# Patient Record
Sex: MALE | Race: WHITE | NOT HISPANIC OR LATINO | Employment: OTHER | URBAN - METROPOLITAN AREA
[De-identification: names, ages, dates, MRNs, and addresses within clinical notes are randomized per-mention and may not be internally consistent; named-entity substitution may affect disease eponyms.]

---

## 2022-03-28 ENCOUNTER — TELEPHONE (OUTPATIENT)
Dept: FAMILY MEDICINE | Facility: CLINIC | Age: 62
End: 2022-03-28
Payer: MEDICARE

## 2022-08-12 ENCOUNTER — TELEPHONE (OUTPATIENT)
Dept: FAMILY MEDICINE | Facility: CLINIC | Age: 62
End: 2022-08-12
Payer: MEDICARE

## 2022-08-12 NOTE — TELEPHONE ENCOUNTER
----- Message from Merlyn Ramsey sent at 8/12/2022  2:59 PM CDT -----  Contact: self  Requesting a call back regarding scheduling new ID patient appointment. Please call back at [phone]

## 2023-01-05 ENCOUNTER — TELEPHONE (OUTPATIENT)
Dept: FAMILY MEDICINE | Facility: CLINIC | Age: 63
End: 2023-01-05
Payer: MEDICARE

## 2023-02-02 ENCOUNTER — OFFICE VISIT (OUTPATIENT)
Dept: INFECTIOUS DISEASES | Facility: CLINIC | Age: 63
End: 2023-02-02
Payer: MEDICARE

## 2023-02-02 VITALS
HEART RATE: 78 BPM | SYSTOLIC BLOOD PRESSURE: 133 MMHG | DIASTOLIC BLOOD PRESSURE: 75 MMHG | OXYGEN SATURATION: 98 % | HEIGHT: 68 IN

## 2023-02-02 DIAGNOSIS — B18.2 CHRONIC HEPATITIS C WITHOUT HEPATIC COMA: Primary | ICD-10-CM

## 2023-02-02 DIAGNOSIS — R74.01 TRANSAMINITIS: ICD-10-CM

## 2023-02-02 PROCEDURE — 3075F SYST BP GE 130 - 139MM HG: CPT | Mod: CPTII,S$GLB,, | Performed by: NURSE PRACTITIONER

## 2023-02-02 PROCEDURE — 3075F PR MOST RECENT SYSTOLIC BLOOD PRESS GE 130-139MM HG: ICD-10-PCS | Mod: CPTII,S$GLB,, | Performed by: NURSE PRACTITIONER

## 2023-02-02 PROCEDURE — 3078F PR MOST RECENT DIASTOLIC BLOOD PRESSURE < 80 MM HG: ICD-10-PCS | Mod: CPTII,S$GLB,, | Performed by: NURSE PRACTITIONER

## 2023-02-02 PROCEDURE — 1159F PR MEDICATION LIST DOCUMENTED IN MEDICAL RECORD: ICD-10-PCS | Mod: CPTII,S$GLB,, | Performed by: NURSE PRACTITIONER

## 2023-02-02 PROCEDURE — 99204 OFFICE O/P NEW MOD 45 MIN: CPT | Mod: S$GLB,,, | Performed by: NURSE PRACTITIONER

## 2023-02-02 PROCEDURE — 99204 PR OFFICE/OUTPT VISIT, NEW, LEVL IV, 45-59 MIN: ICD-10-PCS | Mod: S$GLB,,, | Performed by: NURSE PRACTITIONER

## 2023-02-02 PROCEDURE — 1159F MED LIST DOCD IN RCRD: CPT | Mod: CPTII,S$GLB,, | Performed by: NURSE PRACTITIONER

## 2023-02-02 PROCEDURE — 3078F DIAST BP <80 MM HG: CPT | Mod: CPTII,S$GLB,, | Performed by: NURSE PRACTITIONER

## 2023-02-02 RX ORDER — BUSPIRONE HYDROCHLORIDE 10 MG/1
TABLET ORAL
COMMUNITY
Start: 2023-01-16

## 2023-02-02 RX ORDER — METFORMIN HYDROCHLORIDE 500 MG/1
TABLET ORAL
COMMUNITY
Start: 2023-01-15

## 2023-02-02 RX ORDER — GABAPENTIN 100 MG/1
100 CAPSULE ORAL 2 TIMES DAILY PRN
COMMUNITY
Start: 2023-01-12

## 2023-02-02 RX ORDER — ATORVASTATIN CALCIUM 10 MG/1
TABLET, FILM COATED ORAL
COMMUNITY

## 2023-02-02 RX ORDER — BUDESONIDE AND FORMOTEROL FUMARATE DIHYDRATE 160; 4.5 UG/1; UG/1
AEROSOL RESPIRATORY (INHALATION)
COMMUNITY

## 2023-02-02 NOTE — PROGRESS NOTES
Infectious Diseases Clinic Note    Subjective:       Patient ID: Kirk Reddy is a 62 y.o. male     Chief Complaint: Referral (Chronic hepatitis C)        HEPATOLOGY CLINIC VISIT NOTE - HCV clinic     CHIEF COMPLAINT: Hepatitis C      HISTORY:      62 M. patient is here today for hepatitis-C.  He is accompanied by his wife.  She has a history of hepatitis-C that was treated.  No recent repeat RNA for her.  Patient has a history of COPD, multiple hospital stays, and ER visits.  History of transaminitis.  He has been incarcerated.  He has a history of IV drug use.  He tells me that he had hepatitis-C for over 20 years without treatment.  He has 1 homemade tattoo. Denies current drug or alcohol use.      Denies jaundice, dark urine, hematemesis, melena, abdominal distention. + hallucinations in the past. Also has altered mental status at times. Reports no acute issues at this time.         HCV history:    Originally diagnosed: 20 yrs ago   Prior icteric illnesses: None  Risks for HCV:  IVDA, ETOH, Tattoos, unprotected sex, incarcerated, wife was positive   - Treatment naïve  - Genotype unknown  - NS5A resistance not known                       Past Medical History:   Diagnosis Date    Anxiety disorder, unspecified     Chronic hepatitis C     COPD (chronic obstructive pulmonary disease)     Degenerative joint disease     Depressive disorder     HLD (hyperlipidemia)     Type 2 diabetes mellitus without complications        Social History     Socioeconomic History    Marital status: Single   Tobacco Use    Smoking status: Every Day     Types: Cigarettes    Smokeless tobacco: Never         Current Outpatient Medications:     empagliflozin (JARDIANCE) 25 mg tablet, Jardiance 25 mg tablet, Disp: , Rfl:     atorvastatin (LIPITOR) 10 MG tablet, atorvastatin 10 mg tablet, Disp: , Rfl:     budesonide-formoterol 160-4.5 mcg (SYMBICORT) 160-4.5 mcg/actuation HFAA, Symbicort 160 mcg-4.5 mcg/actuation HFA aerosol inhaler, Disp: ,  Rfl:     busPIRone (BUSPAR) 10 MG tablet, , Disp: , Rfl:     gabapentin (NEURONTIN) 100 MG capsule, Take 100 mg by mouth 2 (two) times daily as needed., Disp: , Rfl:     hepatitis A and B vaccine, PF, (TWINRIX) 720 MARIANO unit- 20 mcg/mL Syrg suspension, 1 mL given IM on a 0-, 1-, and 6-month schedule for a total of 3 doses, Disp: 1 mL, Rfl: 2    metFORMIN (GLUCOPHAGE) 500 MG tablet, , Disp: , Rfl:     Review of Systems   Constitutional:  Negative for appetite change, chills and fever.   Eyes:  Negative for visual disturbance.   Respiratory:  Negative for cough, chest tightness, shortness of breath and wheezing.    Cardiovascular:  Negative for chest pain.   Gastrointestinal:  Negative for abdominal distention, abdominal pain, blood in stool, diarrhea, nausea and vomiting.   Endocrine: Negative for polydipsia, polyphagia and polyuria.   Genitourinary:  Negative for difficulty urinating, dysuria, flank pain and hematuria.   Musculoskeletal:  Negative for back pain and neck pain.   Skin:  Negative for rash.   Neurological:  Negative for dizziness, tremors and headaches.   Hematological:  Negative for adenopathy. Does not bruise/bleed easily.   Psychiatric/Behavioral:  Negative for confusion, dysphoric mood, hallucinations, sleep disturbance and suicidal ideas.          Objective:      Vitals:    02/02/23 1445   BP: 133/75   Pulse: 78     Physical Exam  Vitals and nursing note reviewed.   Constitutional:       General: He is awake. He is not in acute distress.     Appearance: He is underweight. He is not ill-appearing, toxic-appearing or diaphoretic.   HENT:      Head: Normocephalic and atraumatic.   Eyes:      General: Scleral icterus present.      Pupils: Pupils are equal, round, and reactive to light.   Cardiovascular:      Rate and Rhythm: Normal rate.   Pulmonary:      Effort: Pulmonary effort is normal.   Abdominal:      General: Abdomen is protuberant. Bowel sounds are normal.      Palpations: Abdomen is soft.    Musculoskeletal:         General: Normal range of motion.   Skin:     General: Skin is warm and dry.      Coloration: Skin is not jaundiced.      Findings: No rash.   Neurological:      Mental Status: He is alert and oriented to person, place, and time. Mental status is at baseline.   Psychiatric:         Attention and Perception: Attention and perception normal.         Mood and Affect: Mood and affect normal.         Speech: Speech normal.         Behavior: Behavior normal. Behavior is cooperative.         Thought Content: Thought content normal.         Cognition and Memory: Cognition and memory normal.         Judgment: Judgment normal.           Assessment/Plan:       1. Chronic hepatitis C without hepatic coma    2. Transaminitis      Problem List Items Addressed This Visit          GI    Chronic hepatitis C - Primary    Current Assessment & Plan        HCV history:    HCV Ab+  HCV RNA> 400K     Originally diagnosed: 20 yrs ago   Prior icteric illnesses: None  Risks for HCV:  IVDA, ETOH, Tattoos, unprotected sex, incarcerated, wife was positive   - Treatment naïve  - Genotype unknown  - NS5A resistance not known    PLAN    1. Baseline labs. Handed a copy of orders.    2. US abd. Orders provided.   3. Hand copy of Hep A/B vaccine   4. F/u in 4 weeks for treatment.          Relevant Medications    hepatitis A and B vaccine, PF, (TWINRIX) 720 MARIANO unit- 20 mcg/mL Syrg suspension    Other Relevant Orders    HIV 1/2 Ag/Ab (4th Gen)    AFP Tumor Marker    Ammonia    Phosphatidylethanol (PETH)    APTT    Protime-INR    Iron, TIBC and Ferritin Panel    Comprehensive Metabolic Panel    CBC Auto Differential    Hepatitis C Genotype    Hepatitis B Surface Ab, Qualitative    Hepatitis B Surface Antigen    HCV Fibrosure    Hepatitis C RNA, Quantitative, PCR    Hepatitis C Antibody    Hepatitis B Core Antibody, IgM    Hepatitis A Antibody, IgM    Hepatitis B e antibody    Hepatitis B e Antigen    Treponema Pallidum  (Syphillis) Antibody    Hepatitis B Core Antibody, Total    US Abdomen Limited     Other Visit Diagnoses       Transaminitis        Relevant Medications    hepatitis A and B vaccine, PF, (TWINRIX) 720 MARIANO unit- 20 mcg/mL Syrg suspension    Other Relevant Orders    HIV 1/2 Ag/Ab (4th Gen)    AFP Tumor Marker    Ammonia    Phosphatidylethanol (PETH)    APTT    Protime-INR    Iron, TIBC and Ferritin Panel    Comprehensive Metabolic Panel    CBC Auto Differential    Hepatitis C Genotype    Hepatitis B Surface Ab, Qualitative    Hepatitis B Surface Antigen    HCV Fibrosure    Hepatitis C RNA, Quantitative, PCR    Hepatitis C Antibody    Hepatitis B Core Antibody, IgM    Hepatitis A Antibody, IgM    Hepatitis B e antibody    Hepatitis B e Antigen    Treponema Pallidum (Syphillis) Antibody    Hepatitis B Core Antibody, Total    US Abdomen Limited           No visits with results within 1 Month(s) from this visit.   Latest known visit with results is:   No results found for any previous visit.      No results found in the last 30 days.      Duration of encounter: 40 minutes  This includes face-to-face time and non face-to-face time preparing to see the patient (eg, review of tests), obtaining and/or reviewing separately obtained history, documenting clinical information in the electronic or other health record, independently interpreting resultsand communicating results to the patient/family/caregiver, or care coordination.      All diagnostic data (labs/imaging) was reviewed with the patient and/or family member in the room.  All questions were answered to their liking. The patient and/or family member voiced understanding of all instructions provided. Expectations regarding follow up and treatment plan were voiced and confirmed prior to departure. The patient was given orders/instructions at the end of the visit for reference. They were instructed to notify my office if they have not been contacted for  imaging/referrals/labs/results in 1-2 weeks. They voiced understanding of all of the above.     Follow up:     Patient Instructions     EDUCATION:  The natural history of Hepatitis C, including potential progression to cirrhosis was reviewed. We discussed the increased progression of liver disease secondary to alcohol use; patient was advised to avoid alcohol completely.      Transmission of Hepatitis C was reviewed, including possible sexual transmission. Sexual contacts should be screened. Patient should avoid sharing personal products such as razors, toothbrushes, etc.      Recommend avoiding raw seafood.  Limit acetaminophen to 2000mg daily.  What can you do about your liver???  Avoid alcohol and/or drugs  Avoid NSAIDs (ibuprofen, naproxen, aleve, etc)  Limit the use of Tylenol containing products  Avoid sleeping pills  Receive Hepatitis A & B vaccinations  Receive Pneumococcal vaccinations in addition to annual flu vaccines  Avoid sodium in your diet. Avoid canned or prepared foods   Avoid constipation   Quit smoking           Patient Education       Hepatitis C Discharge Instructions   About this topic   Hepatitis C is a disease that harms the liver. It is caused by a virus and can either cause an acute or chronic infection. The liver becomes damaged and does not work well. The virus spreads from person to person through contact with infected blood. Two ways this may happen are having unprotected sex or sharing needles. Also, children born to a mother with hepatitis C may get hepatitis C. There are many other ways you may get hepatitis C. It is the most serious kind of hepatitis. Many people have hepatitis C but they do not know it because they have no symptoms. It is important to know you cannot get hepatitis C from hugging, kissing, or sharing food.  Hepatitis C can often be cured with drugs. If not treated, some people will need a liver transplant.     What care is needed at home?   Ask your doctor what you  need to do when you go home. Make sure you ask questions if you do not understand what the doctor says. This way you will know what you need to do.  Take all of your drugs exactly as the doctor ordered. This is very important. You may want to use an alarm or talking pillbox to help you remember to take each dose on time and to not miss a dose.  Never stop taking your drugs or change the dose without asking your doctor first.  Heat may be used to help with belly pain. If your doctor tells you to use heat, put a heating pad on your belly for no more than 20 minutes at a time. Never go to sleep with a heating pad on as this can cause burns.  Drink plenty of fluids whenever you are thirsty.  Do not drink beer, wine, and mixed drinks (alcohol) as this can cause more liver damage.  Avoid taking drugs and substances that can cause more harm to your liver. Ask your doctor before taking any drugs, vitamins, or supplements.  What follow-up care is needed?   Your condition needs close monitoring. Your doctor may ask you to make visits to the office to check on your progress. Be sure to keep these visits.  What drugs may be needed?   The doctor may order drugs to:  Help with pain  Prevent infection  Slow or stop the virus from damaging your liver  Protect you from other kinds of hepatitis  The drugs may cause side effects. Talk to your doctor if you are having problems taking any of your drugs.  Will physical activity be limited?   You may have to limit your activity. Talk to your doctor about the right amount of activity for you.  What changes to diet are needed?   Eating a healthy diet is important during this time. This means:  Eat whole grain foods and foods high in fiber.  Choose many different fruits and vegetables. Fresh or frozen is best.  Cut back on solid fats like butter or margarine. Eat less fatty and processed foods.  Eat more low-fat or lean meats like chicken, fish, or turkey. Eat less red meat.  Avoid foods  high in sugar.  If you need help, ask to see a dietitian.  What problems could happen?   Liver damage  Liver failure  What can be done to prevent this health problem?   Do not share anything that might have blood on it. This includes razors, clippers, toothbrush, needles, etc.  Wear gloves if you need to handle an infected person's blood.  Use a condom each time you have sex. Limit your number of sexual partners.  Make sure your  uses sterile tools.  Do not donate blood if you have hepatitis C.  Handle used needles and cutting tools with care.  Place used needles in a properly labeled container.  Cover cuts and wounds with a clean dressing.  When do I need to call the doctor?   Signs of infection. These include a fever of 100.4°F (38°C) or higher, chills.  Throwing up, upset stomach, or loose stools that persist  Bloody or black stools  Very dark yellow urine  Yellow skin or eyes  Swelling in your belly  Itching  Teach Back: Helping You Understand   The Teach Back Method helps you understand the information we are giving you. After you talk with the staff, tell them in your own words what you learned. This helps to make sure the staff has described each thing clearly. It also helps to explain things that may have been confusing. Before going home, make sure you can do these:  I can tell you about my condition.  I can tell you what changes I need to make with my diet or drugs.  I can tell you what I will do if I have problems with throwing up, upset stomach, or loose stools or my skin or eyes are yellow.  Where can I learn more?   Centers for Disease Control and Prevention  https://www.cdc.gov/hepatitis/hcv/cfaq.htm   National [place] of Diabetes and Digestive and Kidney Diseases  https://www.niddk.nih.gov/health-information/liver-disease/viral-hepatitis/hepatitis-c   World Health Organization  https://www.who.int/news-room/fact-sheets/detail/hepatitis-c   Last Reviewed Date   2021-03-12  Consumer  Information Use and Disclaimer   This information is not specific medical advice and does not replace information you receive from your health care provider. This is only a brief summary of general information. It does NOT include all information about conditions, illnesses, injuries, tests, procedures, treatments, therapies, discharge instructions or life-style choices that may apply to you. You must talk with your health care provider for complete information about your health and treatment options. This information should not be used to decide whether or not to accept your health care providers advice, instructions or recommendations. Only your health care provider has the knowledge and training to provide advice that is right for you.  Copyright   Copyright © 2021 [employer] and its affiliates and/or licensors. All rights reserved.      Follow up in about 4 weeks (around 3/2/2023), or if symptoms worsen or fail to improve.

## 2023-02-02 NOTE — ASSESSMENT & PLAN NOTE
HCV history:    HCV Ab+  HCV RNA> 400K     Originally diagnosed: 20 yrs ago   Prior icteric illnesses: None  Risks for HCV:  IVDA, ETOH, Tattoos, unprotected sex, incarcerated, wife was positive   - Treatment naïve  - Genotype unknown  - NS5A resistance not known    PLAN    1. Baseline labs. Handed a copy of orders.    2. US abd. Orders provided.   3. Hand copy of Hep A/B vaccine   4. F/u in 4 weeks for treatment.

## 2023-02-02 NOTE — PATIENT INSTRUCTIONS
EDUCATION:  The natural history of Hepatitis C, including potential progression to cirrhosis was reviewed. We discussed the increased progression of liver disease secondary to alcohol use; patient was advised to avoid alcohol completely.      Transmission of Hepatitis C was reviewed, including possible sexual transmission. Sexual contacts should be screened. Patient should avoid sharing personal products such as razors, toothbrushes, etc.      Recommend avoiding raw seafood.  Limit acetaminophen to 2000mg daily.  What can you do about your liver???  Avoid alcohol and/or drugs  Avoid NSAIDs (ibuprofen, naproxen, aleve, etc)  Limit the use of Tylenol containing products  Avoid sleeping pills  Receive Hepatitis A & B vaccinations  Receive Pneumococcal vaccinations in addition to annual flu vaccines  Avoid sodium in your diet. Avoid canned or prepared foods   Avoid constipation   Quit smoking           Patient Education       Hepatitis C Discharge Instructions   About this topic   Hepatitis C is a disease that harms the liver. It is caused by a virus and can either cause an acute or chronic infection. The liver becomes damaged and does not work well. The virus spreads from person to person through contact with infected blood. Two ways this may happen are having unprotected sex or sharing needles. Also, children born to a mother with hepatitis C may get hepatitis C. There are many other ways you may get hepatitis C. It is the most serious kind of hepatitis. Many people have hepatitis C but they do not know it because they have no symptoms. It is important to know you cannot get hepatitis C from hugging, kissing, or sharing food.  Hepatitis C can often be cured with drugs. If not treated, some people will need a liver transplant.     What care is needed at home?   Ask your doctor what you need to do when you go home. Make sure you ask questions if you do not understand what the doctor says. This way you will know what  you need to do.  Take all of your drugs exactly as the doctor ordered. This is very important. You may want to use an alarm or talking pillbox to help you remember to take each dose on time and to not miss a dose.  Never stop taking your drugs or change the dose without asking your doctor first.  Heat may be used to help with belly pain. If your doctor tells you to use heat, put a heating pad on your belly for no more than 20 minutes at a time. Never go to sleep with a heating pad on as this can cause burns.  Drink plenty of fluids whenever you are thirsty.  Do not drink beer, wine, and mixed drinks (alcohol) as this can cause more liver damage.  Avoid taking drugs and substances that can cause more harm to your liver. Ask your doctor before taking any drugs, vitamins, or supplements.  What follow-up care is needed?   Your condition needs close monitoring. Your doctor may ask you to make visits to the office to check on your progress. Be sure to keep these visits.  What drugs may be needed?   The doctor may order drugs to:  Help with pain  Prevent infection  Slow or stop the virus from damaging your liver  Protect you from other kinds of hepatitis  The drugs may cause side effects. Talk to your doctor if you are having problems taking any of your drugs.  Will physical activity be limited?   You may have to limit your activity. Talk to your doctor about the right amount of activity for you.  What changes to diet are needed?   Eating a healthy diet is important during this time. This means:  Eat whole grain foods and foods high in fiber.  Choose many different fruits and vegetables. Fresh or frozen is best.  Cut back on solid fats like butter or margarine. Eat less fatty and processed foods.  Eat more low-fat or lean meats like chicken, fish, or turkey. Eat less red meat.  Avoid foods high in sugar.  If you need help, ask to see a dietitian.  What problems could happen?   Liver damage  Liver failure  What can be done  to prevent this health problem?   Do not share anything that might have blood on it. This includes razors, clippers, toothbrush, needles, etc.  Wear gloves if you need to handle an infected person's blood.  Use a condom each time you have sex. Limit your number of sexual partners.  Make sure your  uses sterile tools.  Do not donate blood if you have hepatitis C.  Handle used needles and cutting tools with care.  Place used needles in a properly labeled container.  Cover cuts and wounds with a clean dressing.  When do I need to call the doctor?   Signs of infection. These include a fever of 100.4°F (38°C) or higher, chills.  Throwing up, upset stomach, or loose stools that persist  Bloody or black stools  Very dark yellow urine  Yellow skin or eyes  Swelling in your belly  Itching  Teach Back: Helping You Understand   The Teach Back Method helps you understand the information we are giving you. After you talk with the staff, tell them in your own words what you learned. This helps to make sure the staff has described each thing clearly. It also helps to explain things that may have been confusing. Before going home, make sure you can do these:  I can tell you about my condition.  I can tell you what changes I need to make with my diet or drugs.  I can tell you what I will do if I have problems with throwing up, upset stomach, or loose stools or my skin or eyes are yellow.  Where can I learn more?   Centers for Disease Control and Prevention  https://www.cdc.gov/hepatitis/hcv/cfaq.htm   National [place] of Diabetes and Digestive and Kidney Diseases  https://www.niddk.nih.gov/health-information/liver-disease/viral-hepatitis/hepatitis-c   World Health Organization  https://www.who.int/news-room/fact-sheets/detail/hepatitis-c   Last Reviewed Date   2021-03-12  Consumer Information Use and Disclaimer   This information is not specific medical advice and does not replace information you receive from your  health care provider. This is only a brief summary of general information. It does NOT include all information about conditions, illnesses, injuries, tests, procedures, treatments, therapies, discharge instructions or life-style choices that may apply to you. You must talk with your health care provider for complete information about your health and treatment options. This information should not be used to decide whether or not to accept your health care providers advice, instructions or recommendations. Only your health care provider has the knowledge and training to provide advice that is right for you.  Copyright   Copyright © 2021 [employer] and its affiliates and/or licensors. All rights reserved.

## 2023-02-22 DIAGNOSIS — R76.8 HEPATITIS B SURFACE ANTIGEN POSITIVE: Primary | ICD-10-CM

## 2023-03-02 ENCOUNTER — OFFICE VISIT (OUTPATIENT)
Dept: INFECTIOUS DISEASES | Facility: CLINIC | Age: 63
End: 2023-03-02
Payer: MEDICARE

## 2023-03-02 DIAGNOSIS — R74.01 TRANSAMINITIS: ICD-10-CM

## 2023-03-02 DIAGNOSIS — B18.2 CHRONIC HEPATITIS C WITHOUT HEPATIC COMA: Primary | ICD-10-CM

## 2023-03-02 DIAGNOSIS — R76.8 HEPATITIS B SURFACE ANTIGEN POSITIVE: ICD-10-CM

## 2023-03-02 DIAGNOSIS — Z71.2 ENCOUNTER TO DISCUSS TEST RESULTS: ICD-10-CM

## 2023-03-02 PROCEDURE — 99214 PR OFFICE/OUTPT VISIT, EST, LEVL IV, 30-39 MIN: ICD-10-PCS | Mod: S$GLB,,, | Performed by: NURSE PRACTITIONER

## 2023-03-02 PROCEDURE — 1159F PR MEDICATION LIST DOCUMENTED IN MEDICAL RECORD: ICD-10-PCS | Mod: CPTII,S$GLB,, | Performed by: NURSE PRACTITIONER

## 2023-03-02 PROCEDURE — 99214 OFFICE O/P EST MOD 30 MIN: CPT | Mod: S$GLB,,, | Performed by: NURSE PRACTITIONER

## 2023-03-02 PROCEDURE — 1159F MED LIST DOCD IN RCRD: CPT | Mod: CPTII,S$GLB,, | Performed by: NURSE PRACTITIONER

## 2023-03-02 NOTE — PROGRESS NOTES
Infectious Diseases Clinic Note    Subjective:       Patient ID: Kirk Reddy is a 63 y.o. male     Chief Complaint: No chief complaint on file.        HEPATOLOGY CLINIC VISIT NOTE - HCV clinic     CHIEF COMPLAINT: Hepatitis C      HISTORY:      62 M. patient is here today for hepatitis-C follow up. HPI unchanged from previous visit.     He is accompanied by his wife.  She has a history of hepatitis-C that was treated.  No recent repeat RNA for her.  Patient has a history of COPD, multiple hospital stays, and ER visits.  History of transaminitis.  He has been incarcerated.  He has a history of IV drug use.  He tells me that he had hepatitis-C for over 20 years without treatment.  He has 1 homemade tattoo. Denies current drug or alcohol use.      Denies jaundice, dark urine, hematemesis, melena, abdominal distention. + hallucinations in the past. Also has altered mental status at times. Reports no acute issues at this time.         HCV history:    Originally diagnosed: 20 yrs ago   Prior icteric illnesses: None  Risks for HCV:  IVDA, ETOH, Tattoos, unprotected sex, incarcerated, wife was positive   - Treatment naïve  - Genotype unknown  - NS5A resistance not known                       Past Medical History:   Diagnosis Date    Anxiety disorder, unspecified     Chronic hepatitis C     COPD (chronic obstructive pulmonary disease)     Degenerative joint disease     Depressive disorder     HLD (hyperlipidemia)     Type 2 diabetes mellitus without complications        Social History     Socioeconomic History    Marital status: Single   Tobacco Use    Smoking status: Every Day     Types: Cigarettes    Smokeless tobacco: Never         Current Outpatient Medications:     atorvastatin (LIPITOR) 10 MG tablet, atorvastatin 10 mg tablet, Disp: , Rfl:     budesonide-formoterol 160-4.5 mcg (SYMBICORT) 160-4.5 mcg/actuation HFAA, Symbicort 160 mcg-4.5 mcg/actuation HFA aerosol inhaler, Disp: , Rfl:     busPIRone (BUSPAR) 10 MG  tablet, , Disp: , Rfl:     empagliflozin (JARDIANCE) 25 mg tablet, Jardiance 25 mg tablet, Disp: , Rfl:     gabapentin (NEURONTIN) 100 MG capsule, Take 100 mg by mouth 2 (two) times daily as needed., Disp: , Rfl:     hepatitis A and B vaccine, PF, (TWINRIX) 720 MARIANO unit- 20 mcg/mL Syrg suspension, 1 mL given IM on a 0-, 1-, and 6-month schedule for a total of 3 doses, Disp: 1 mL, Rfl: 2    metFORMIN (GLUCOPHAGE) 500 MG tablet, , Disp: , Rfl:     Review of Systems   Constitutional:  Negative for appetite change, chills and fever.   Eyes:  Negative for visual disturbance.   Respiratory:  Negative for cough, chest tightness, shortness of breath and wheezing.    Cardiovascular:  Negative for chest pain.   Gastrointestinal:  Negative for abdominal distention, abdominal pain, blood in stool, diarrhea, nausea and vomiting.   Endocrine: Negative for polydipsia, polyphagia and polyuria.   Genitourinary:  Negative for difficulty urinating, dysuria, flank pain and hematuria.   Musculoskeletal:  Negative for back pain and neck pain.   Skin:  Negative for rash.   Neurological:  Negative for dizziness, tremors and headaches.   Hematological:  Negative for adenopathy. Does not bruise/bleed easily.   Psychiatric/Behavioral:  Negative for confusion, dysphoric mood, hallucinations, sleep disturbance and suicidal ideas.          Objective:      There were no vitals filed for this visit.  Physical Exam  Vitals and nursing note reviewed.   Constitutional:       General: He is awake. He is not in acute distress.     Appearance: He is underweight. He is not ill-appearing, toxic-appearing or diaphoretic.   HENT:      Head: Normocephalic and atraumatic.   Eyes:      General: Scleral icterus present.      Pupils: Pupils are equal, round, and reactive to light.   Cardiovascular:      Rate and Rhythm: Normal rate.   Pulmonary:      Effort: Pulmonary effort is normal.   Abdominal:      General: Abdomen is protuberant. Bowel sounds are normal.       Palpations: Abdomen is soft.   Musculoskeletal:         General: Normal range of motion.   Skin:     General: Skin is warm and dry.      Coloration: Skin is not jaundiced.      Findings: No rash.   Neurological:      Mental Status: He is alert and oriented to person, place, and time. Mental status is at baseline.   Psychiatric:         Attention and Perception: Attention and perception normal.         Mood and Affect: Mood and affect normal.         Speech: Speech normal.         Behavior: Behavior normal. Behavior is cooperative.         Thought Content: Thought content normal.         Cognition and Memory: Cognition and memory normal.         Judgment: Judgment normal.           Assessment/Plan:       1. Chronic hepatitis C without hepatic coma    2. Hepatitis B surface antigen positive    3. Transaminitis      Problem List Items Addressed This Visit          GI    Chronic hepatitis C - Primary    Current Assessment & Plan       HCV history:     HCV Ab+  HCV RNA> 400K      Originally diagnosed: 20 yrs ago   Prior icteric illnesses: None  Risks for HCV:  IVDA, ETOH, Tattoos, unprotected sex, incarcerated, wife was positive   - Treatment naïve  - Genotype unknown  - NS5A resistance not known    Immunity to HAV & HBV - vaccination in progress  HIV screen neg  Hep B Surf Ag Reactive   Hep B Surf Ab non-reactive   Hep B Core total Ab  Abnormal liver imaging:        Liver staging:    FibroScan = kPa , , (CAP , S )  Fibrosure = F1-2  Liver function = transaminitis  U/S =  CT Abd/pevis =      FIB-4 --   APRI -   MELDS --         PLAN     1.  Handed a copy of orders.  Unfortunately, all of the labs were not performed.  2. US abd. Orders provided.   3.  Hep A/B vaccine in progress.   4. F/u in 2 weeks for treatment.          Relevant Orders    HCV Fibrosure    Hepatitis C RNA, Quantitative, PCR    HEPATITIS B VIRAL DNA, QUANTITATIVE    Hepatitis B e antibody    Hepatitis B e Antigen    Hepatitis Delta Virus     Hepatitis C Genotype    Hepatitis B Core Antibody, Total    Hepatitis B Core Antibody, IgM    Hepatitis B surface antigen positive    Current Assessment & Plan     Prior result was negative.     This result may actually be from his recent vaccination series.       PLAN    1. Hep B serology to confirm.            Relevant Orders    HCV Fibrosure    Hepatitis C RNA, Quantitative, PCR    HEPATITIS B VIRAL DNA, QUANTITATIVE    Hepatitis B e antibody    Hepatitis B e Antigen    Hepatitis Delta Virus    Hepatitis C Genotype    Hepatitis B Core Antibody, Total    Hepatitis B Core Antibody, IgM     Other Visit Diagnoses       Transaminitis               No visits with results within 1 Month(s) from this visit.   Latest known visit with results is:   No results found for any previous visit.      No results found in the last 30 days.      Duration of encounter: 25 minutes  This includes face-to-face time and non face-to-face time preparing to see the patient (eg, review of tests), obtaining and/or reviewing separately obtained history, documenting clinical information in the electronic or other health record, independently interpreting resultsand communicating results to the patient/family/caregiver, or care coordination.      All diagnostic data (labs/imaging) was reviewed with the patient and/or family member in the room.  All questions were answered to their liking. The patient and/or family member voiced understanding of all instructions provided. Expectations regarding follow up and treatment plan were voiced and confirmed prior to departure. The patient was given orders/instructions at the end of the visit for reference. They were instructed to notify my office if they have not been contacted for imaging/referrals/labs/results in 1-2 weeks. They voiced understanding of all of the above.     Follow up:     There are no Patient Instructions on file for this visit.     Follow up in about 2 weeks (around 3/16/2023), or if  symptoms worsen or fail to improve.

## 2023-03-02 NOTE — ASSESSMENT & PLAN NOTE
HCV history:     HCV Ab+  HCV RNA> 400K      Originally diagnosed: 20 yrs ago   Prior icteric illnesses: None  Risks for HCV:  IVDA, ETOH, Tattoos, unprotected sex, incarcerated, wife was positive   - Treatment naïve  - Genotype unknown  - NS5A resistance not known    · Immunity to HAV & HBV - vaccination in progress  · HIV screen neg  · Hep B Surf Ag Reactive   · Hep B Surf Ab non-reactive   · Hep B Core total Ab  · Abnormal liver imaging:        Liver staging:    FibroScan = kPa , , (CAP , S )  Fibrosure = F1-2  Liver function = transaminitis  U/S =  CT Abd/pevis =      FIB-4 --   APRI -   MELDS --         PLAN     1.  Handed a copy of orders.  Unfortunately, all of the labs were not performed.  2. US abd. Orders provided.   3.  Hep A/B vaccine in progress.   4. F/u in 2 weeks for treatment.

## 2023-03-02 NOTE — ASSESSMENT & PLAN NOTE
Prior result was negative.     This result may actually be from his recent vaccination series.       PLAN    1. Hep B serology to confirm.

## 2023-03-20 ENCOUNTER — OFFICE VISIT (OUTPATIENT)
Dept: INFECTIOUS DISEASES | Facility: CLINIC | Age: 63
End: 2023-03-20
Payer: MEDICARE

## 2023-03-20 VITALS
HEIGHT: 68 IN | OXYGEN SATURATION: 88 % | DIASTOLIC BLOOD PRESSURE: 58 MMHG | BODY MASS INDEX: 23.29 KG/M2 | RESPIRATION RATE: 18 BRPM | SYSTOLIC BLOOD PRESSURE: 119 MMHG | WEIGHT: 153.63 LBS | TEMPERATURE: 99 F | HEART RATE: 113 BPM

## 2023-03-20 DIAGNOSIS — R76.8 HEPATITIS B SURFACE ANTIGEN POSITIVE: Primary | ICD-10-CM

## 2023-03-20 DIAGNOSIS — B18.2 CHRONIC HEPATITIS C WITHOUT HEPATIC COMA: ICD-10-CM

## 2023-03-20 PROCEDURE — 3074F PR MOST RECENT SYSTOLIC BLOOD PRESSURE < 130 MM HG: ICD-10-PCS | Mod: CPTII,S$GLB,, | Performed by: NURSE PRACTITIONER

## 2023-03-20 PROCEDURE — 3008F BODY MASS INDEX DOCD: CPT | Mod: CPTII,S$GLB,, | Performed by: NURSE PRACTITIONER

## 2023-03-20 PROCEDURE — 3078F PR MOST RECENT DIASTOLIC BLOOD PRESSURE < 80 MM HG: ICD-10-PCS | Mod: CPTII,S$GLB,, | Performed by: NURSE PRACTITIONER

## 2023-03-20 PROCEDURE — 3078F DIAST BP <80 MM HG: CPT | Mod: CPTII,S$GLB,, | Performed by: NURSE PRACTITIONER

## 2023-03-20 PROCEDURE — 3074F SYST BP LT 130 MM HG: CPT | Mod: CPTII,S$GLB,, | Performed by: NURSE PRACTITIONER

## 2023-03-20 PROCEDURE — 3008F PR BODY MASS INDEX (BMI) DOCUMENTED: ICD-10-PCS | Mod: CPTII,S$GLB,, | Performed by: NURSE PRACTITIONER

## 2023-03-20 PROCEDURE — 99214 OFFICE O/P EST MOD 30 MIN: CPT | Mod: S$GLB,,, | Performed by: NURSE PRACTITIONER

## 2023-03-20 PROCEDURE — 99214 PR OFFICE/OUTPT VISIT, EST, LEVL IV, 30-39 MIN: ICD-10-PCS | Mod: S$GLB,,, | Performed by: NURSE PRACTITIONER

## 2023-03-20 PROCEDURE — 1159F PR MEDICATION LIST DOCUMENTED IN MEDICAL RECORD: ICD-10-PCS | Mod: CPTII,S$GLB,, | Performed by: NURSE PRACTITIONER

## 2023-03-20 PROCEDURE — 1159F MED LIST DOCD IN RCRD: CPT | Mod: CPTII,S$GLB,, | Performed by: NURSE PRACTITIONER

## 2023-03-20 NOTE — ASSESSMENT & PLAN NOTE
3/17/2022    Hep A IMG non-reactive  Hep B Core Ab IGM non-reactive  Hep B Surf Ag Non-reactive              2/17/2023    Hep B Core IGM = Non-reactive   Hep B Surf Ag Reactive - suspect 2/t recent vaccination.                3/10/2023    HEPATITIS B CORE AB TOTAL SLI    HEP B CORE AB, TOTAL NON-REACTIVE NON-REACTIVE  HEPATITIS BE ANTIGEN SLI    Hepatitis Be Antigen NON-REACTIVE NON-REACTIVE       HEPATITIS BE ANTIBODY SLI    Hepatitis Be Antibody NON-REACTIVE NON-REACTIVE          HEPATITIS B DNA, QUANTITATIVE, REAL-TIME PCR EZ    HEPATITIS B VIRUS DNA EZ<10 NOT DETECTED IU/mL    HEPATITIS B VIRUS DNA EZ<1.00 NOT DETECTED Log IU/mL              PLAN    1. Thoroughly explained the possible scenarios to the patient which included recent vaccination exposure versus recent exposure to hepatitis-B.  We will retest him for immunity/exposure 3 months from his recent vaccination.     2. F/u in 3 months for discussion.

## 2023-03-20 NOTE — ASSESSMENT & PLAN NOTE
HCV history:     HCV Ab+  HCV RNA> 400K      Originally diagnosed: 20 yrs ago   Prior icteric illnesses: None  Risks for HCV:  IVDA, ETOH, Tattoos, unprotected sex, incarcerated, wife was positive   - Treatment naïve  - Genotype 1A  - NS5A resistance not known      Immunity to HAV & HBV - vaccination in progress   HIV screen neg   Hep B Surf Ag Reactive    Hep B Surf Ab non-reactive    Hep B Core total Ab non-reactive    Abnormal liver imaging:         Liver staging:     FibroScan = kPa , , (CAP , S )  Fibrosure = F1-2  Liver function = transaminitis  U/S =  CT Abd/pevis =      FIB-4 --   APRI -   MELDS --    HCV RNA, QUANTITATIVE REAL TIME PCR EZ    HCV RNA PCR QT [id number] H IU/mL    HCV RNA QUANT 6.73 H Log IU/mL     HEPATITIS B CORE AB TOTAL SLI    HEP B CORE AB, TOTAL NON-REACTIVE NON-REACTIVE  HEPATITIS BE ANTIGEN SLI    Hepatitis Be Antigen NON-REACTIVE NON-REACTIVE       HEPATITIS BE ANTIBODY SLI    Hepatitis Be Antibody NON-REACTIVE NON-REACTIVE          HEPATITIS B DNA, QUANTITATIVE, REAL-TIME PCR EZ    HEPATITIS B VIRUS DNA EZ<10 NOT DETECTED IU/mL    HEPATITIS B VIRUS DNA EZ<1.00 NOT DETECTED Log IU/mL  REFERENCE RANGE:            NOT DETECTED  IU/mL            NOT DETECTED  Log IU/mL       Fibrosis Stage 35W4-K5    Necroinflammat Activity 01A2-Moderate activity    Bilirubin, Total 010.3 0.0-1.2 mg/dL    ALT (SGPT) P5P 0194 H 0-55 IU/L    Haptoglobin 10585  mg/dL    GGT 0137 0-65 IU/L    Apolipoprotein A-1 55435 101-178 mg/dL    Comment 01Comment      HEPATITIS C VIRAL RNA GENOTYPE EZ    HCV GENOTYPE 1a          HEPATITIS D VIRUS (HDV) ANTIBODY, TOTAL EZ    HEP D AB, TOTAL NEGATIVE            PLAN     1.  Labs discussed.   2. US abd. Pending...   3.  Hep A/B vaccine in progress.   4. F/u in May to discuss Hep B results and starting therapy for Hep C>

## 2023-03-20 NOTE — PROGRESS NOTES
Infectious Diseases Clinic Note    Subjective:       Patient ID: Kirk Reddy is a 63 y.o. male     Chief Complaint: Follow-up        HEPATOLOGY CLINIC VISIT NOTE - HCV clinic     CHIEF COMPLAINT: Hepatitis C      HISTORY:      62 M. patient is here today for hepatitis-C follow up. HPI unchanged from previous visit.     Patient has a history of COPD, multiple hospital stays, and ER visits.  History of transaminitis.  He has been incarcerated.  He has a history of IV drug use.  He tells me that he had hepatitis-C for over 20 years without treatment.  He has 1 homemade tattoo. Denies current drug or alcohol use.      Denies jaundice, dark urine, hematemesis, melena, abdominal distention. + hallucinations in the past. Also has altered mental status at times. Reports no acute issues at this time.         HCV history:    Originally diagnosed: 20 yrs ago   Prior icteric illnesses: None  Risks for HCV:  IVDA, ETOH, Tattoos, unprotected sex, incarcerated, wife was positive   - Treatment naïve  - Genotype 1A  - NS5A resistance not known                       Past Medical History:   Diagnosis Date    Anxiety disorder, unspecified     Chronic hepatitis C     COPD (chronic obstructive pulmonary disease)     Degenerative joint disease     Depressive disorder     HLD (hyperlipidemia)     Type 2 diabetes mellitus without complications        Social History     Socioeconomic History    Marital status: Single   Tobacco Use    Smoking status: Every Day     Packs/day: 0.50     Years: 40.00     Pack years: 20.00     Types: Cigarettes    Smokeless tobacco: Never   Substance and Sexual Activity    Alcohol use: Not Currently    Drug use: Never         Current Outpatient Medications:     atorvastatin (LIPITOR) 10 MG tablet, atorvastatin 10 mg tablet, Disp: , Rfl:     budesonide-formoterol 160-4.5 mcg (SYMBICORT) 160-4.5 mcg/actuation HFAA, Symbicort 160 mcg-4.5 mcg/actuation HFA aerosol inhaler, Disp: , Rfl:     busPIRone (BUSPAR) 10 MG  tablet, , Disp: , Rfl:     empagliflozin (JARDIANCE) 25 mg tablet, Jardiance 25 mg tablet, Disp: , Rfl:     gabapentin (NEURONTIN) 100 MG capsule, Take 100 mg by mouth 2 (two) times daily as needed., Disp: , Rfl:     hepatitis A and B vaccine, PF, (TWINRIX) 720 MARIANO unit- 20 mcg/mL Syrg suspension, 1 mL given IM on a 0-, 1-, and 6-month schedule for a total of 3 doses, Disp: 1 mL, Rfl: 2    metFORMIN (GLUCOPHAGE) 500 MG tablet, , Disp: , Rfl:     multivit-minerals/folic acid (CENTRUM ADULTS ORAL), 1 tab(s) orally once a day for 30 day(s), Disp: , Rfl:     Review of Systems   Constitutional:  Negative for appetite change, chills and fever.   Eyes:  Negative for visual disturbance.   Respiratory:  Negative for cough, chest tightness, shortness of breath and wheezing.    Cardiovascular:  Negative for chest pain.   Gastrointestinal:  Negative for abdominal distention, abdominal pain, blood in stool, diarrhea, nausea and vomiting.   Endocrine: Negative for polydipsia, polyphagia and polyuria.   Genitourinary:  Negative for difficulty urinating, dysuria, flank pain and hematuria.   Musculoskeletal:  Negative for back pain and neck pain.   Skin:  Negative for rash.   Neurological:  Negative for dizziness, tremors and headaches.   Hematological:  Negative for adenopathy. Does not bruise/bleed easily.   Psychiatric/Behavioral:  Negative for confusion, dysphoric mood, hallucinations, sleep disturbance and suicidal ideas.          Objective:      Vitals:    03/20/23 1405   BP: (!) 119/58   Pulse: (!) 113   Resp: 18   Temp: 98.5 °F (36.9 °C)     Physical Exam  Vitals and nursing note reviewed.   Constitutional:       General: He is awake. He is not in acute distress.     Appearance: He is underweight. He is not ill-appearing, toxic-appearing or diaphoretic.   HENT:      Head: Normocephalic and atraumatic.   Eyes:      General: Scleral icterus present.      Pupils: Pupils are equal, round, and reactive to light.    Cardiovascular:      Rate and Rhythm: Normal rate.   Pulmonary:      Effort: Pulmonary effort is normal.   Abdominal:      General: Abdomen is protuberant. Bowel sounds are normal.      Palpations: Abdomen is soft.   Musculoskeletal:         General: Normal range of motion.   Skin:     General: Skin is warm and dry.      Coloration: Skin is not jaundiced.      Findings: No rash.   Neurological:      Mental Status: He is alert and oriented to person, place, and time. Mental status is at baseline.   Psychiatric:         Attention and Perception: Attention and perception normal.         Mood and Affect: Mood and affect normal.         Speech: Speech normal.         Behavior: Behavior normal. Behavior is cooperative.         Thought Content: Thought content normal.         Cognition and Memory: Cognition and memory normal.         Judgment: Judgment normal.           Assessment/Plan:       1. Hepatitis B surface antigen positive    2. Chronic hepatitis C without hepatic coma      Problem List Items Addressed This Visit          GI    Chronic hepatitis C    Current Assessment & Plan     HCV history:     HCV Ab+  HCV RNA> 400K      Originally diagnosed: 20 yrs ago   Prior icteric illnesses: None  Risks for HCV:  IVDA, ETOH, Tattoos, unprotected sex, incarcerated, wife was positive   - Treatment naïve  - Genotype 1A  - NS5A resistance not known     Immunity to HAV & HBV - vaccination in progress  HIV screen neg  Hep B Surf Ag Reactive   Hep B Surf Ab non-reactive   Hep B Core total Ab non-reactive   Abnormal liver imaging:         Liver staging:     FibroScan = kPa , , (CAP , S )  Fibrosure = F1-2  Liver function = transaminitis  U/S =  CT Abd/pevis =      FIB-4 --   APRI -   MELDS --    HCV RNA, QUANTITATIVE REAL TIME PCR EZ    HCV RNA PCR QT [id number] H IU/mL    HCV RNA QUANT 6.73 H Log IU/mL     HEPATITIS B CORE AB TOTAL SLI    HEP B CORE AB, TOTAL NON-REACTIVE NON-REACTIVE  HEPATITIS BE ANTIGEN SLI    Hepatitis Be  Antigen NON-REACTIVE NON-REACTIVE       HEPATITIS BE ANTIBODY SLI    Hepatitis Be Antibody NON-REACTIVE NON-REACTIVE          HEPATITIS B DNA, QUANTITATIVE, REAL-TIME PCR EZ    HEPATITIS B VIRUS DNA EZ<10 NOT DETECTED IU/mL    HEPATITIS B VIRUS DNA EZ<1.00 NOT DETECTED Log IU/mL  REFERENCE RANGE:            NOT DETECTED  IU/mL            NOT DETECTED  Log IU/mL       Fibrosis Stage 84R4-D6    Necroinflammat Activity 01A2-Moderate activity    Bilirubin, Total 010.3 0.0-1.2 mg/dL    ALT (SGPT) P5P 0194 H 0-55 IU/L    Haptoglobin 74660  mg/dL    GGT 0137 0-65 IU/L    Apolipoprotein A-1 86010 101-178 mg/dL    Comment 01Comment      HEPATITIS C VIRAL RNA GENOTYPE EZ    HCV GENOTYPE 1a          HEPATITIS D VIRUS (HDV) ANTIBODY, TOTAL EZ    HEP D AB, TOTAL NEGATIVE            PLAN     1.  Labs discussed.   2. US abd. Pending...   3.  Hep A/B vaccine in progress.   4. F/u in May to discuss Hep B results and starting therapy for Hep C>                     Relevant Orders    Hepatitis B Surface Antigen    Hepatitis B Surface Ab, Qualitative    Hepatitis B Core Antibody, IgM    Hepatitis B Core Antibody, Total    Hepatitis B e Antigen    Hepatitis B e antibody    HEPATITIS B VIRAL DNA, QUANTITATIVE    Hepatitis B surface antigen positive - Primary    Current Assessment & Plan         3/17/2022    Hep A IMG non-reactive  Hep B Core Ab IGM non-reactive  Hep B Surf Ag Non-reactive              2/17/2023    Hep B Core IGM = Non-reactive   Hep B Surf Ag Reactive - suspect 2/t recent vaccination.                3/10/2023    HEPATITIS B CORE AB TOTAL SLI    HEP B CORE AB, TOTAL NON-REACTIVE NON-REACTIVE  HEPATITIS BE ANTIGEN SLI    Hepatitis Be Antigen NON-REACTIVE NON-REACTIVE       HEPATITIS BE ANTIBODY SLI    Hepatitis Be Antibody NON-REACTIVE NON-REACTIVE          HEPATITIS B DNA, QUANTITATIVE, REAL-TIME PCR EZ    HEPATITIS B VIRUS DNA EZ<10 NOT DETECTED IU/mL    HEPATITIS B VIRUS DNA EZ<1.00 NOT DETECTED Log  IU/mL              PLAN    1. Thoroughly explained the possible scenarios to the patient which included recent vaccination exposure versus recent exposure to hepatitis-B.  We will retest him for immunity/exposure 3 months from his recent vaccination.     2. F/u in 3 months for discussion.          Relevant Orders    Hepatitis B Surface Antigen    Hepatitis B Surface Ab, Qualitative    Hepatitis B Core Antibody, IgM    Hepatitis B Core Antibody, Total    Hepatitis B e Antigen    Hepatitis B e antibody    HEPATITIS B VIRAL DNA, QUANTITATIVE      No visits with results within 1 Month(s) from this visit.   Latest known visit with results is:   No results found for any previous visit.        Prior to your hepatitis B vaccination, your Hepatitis B surface Antibody was negative.  This means you did not have protective immunity at the time.  You were given the vaccination in an effort to give you immunity to hepatitis B. Your labs were done in a window of time which could show a positive Hepatitis B Surface Antigen response.  When a hepatitis B surface antigen is positive, it can mean several things. A positive result can mean recent vaccination exposure.  It can also mean a recent exposure to Hepatitis B while unvaccinated.  In both cases, you will need to have repeat Hepatitis B testing to determine if your body will develop immunity to Hepatitis B.  This needs to be done 3 months after vaccination and/or exposure. We can not start therapy until these tests are complete.   You will be given tests for your upcoming appointment at the end of May. Please get the tests done around May 15th for your upcoming appointment.           No results found in the last 30 days.      Duration of encounter: 35 minutes  This includes face-to-face time and non face-to-face time preparing to see the patient (eg, review of tests), obtaining and/or reviewing separately obtained history, documenting clinical information in the electronic or  other health record, independently interpreting resultsand communicating results to the patient/family/caregiver, or care coordination.      All diagnostic data (labs/imaging) was reviewed with the patient and/or family member in the room.  All questions were answered to their liking. The patient and/or family member voiced understanding of all instructions provided. Expectations regarding follow up and treatment plan were voiced and confirmed prior to departure. The patient was given orders/instructions at the end of the visit for reference. They were instructed to notify my office if they have not been contacted for imaging/referrals/labs/results in 1-2 weeks. They voiced understanding of all of the above.     Follow up:     There are no Patient Instructions on file for this visit.     Follow up in about 2 months (around 5/30/2023), or if symptoms worsen or fail to improve.

## 2023-12-05 DIAGNOSIS — B18.2 CHRONIC HEPATITIS C WITHOUT HEPATIC COMA: Primary | ICD-10-CM

## 2023-12-05 DIAGNOSIS — R76.8 HEPATITIS B SURFACE ANTIGEN POSITIVE: ICD-10-CM
